# Patient Record
Sex: FEMALE | Race: OTHER | HISPANIC OR LATINO | Employment: UNEMPLOYED | URBAN - METROPOLITAN AREA
[De-identification: names, ages, dates, MRNs, and addresses within clinical notes are randomized per-mention and may not be internally consistent; named-entity substitution may affect disease eponyms.]

---

## 2019-03-04 ENCOUNTER — APPOINTMENT (EMERGENCY)
Dept: RADIOLOGY | Facility: HOSPITAL | Age: 19
End: 2019-03-04
Payer: OTHER MISCELLANEOUS

## 2019-03-04 ENCOUNTER — HOSPITAL ENCOUNTER (EMERGENCY)
Facility: HOSPITAL | Age: 19
Discharge: HOME | End: 2019-03-04
Attending: EMERGENCY MEDICINE
Payer: OTHER MISCELLANEOUS

## 2019-03-04 VITALS
TEMPERATURE: 98.2 F | HEART RATE: 78 BPM | WEIGHT: 196 LBS | SYSTOLIC BLOOD PRESSURE: 128 MMHG | OXYGEN SATURATION: 99 % | RESPIRATION RATE: 16 BRPM | DIASTOLIC BLOOD PRESSURE: 75 MMHG

## 2019-03-04 DIAGNOSIS — R07.89 LEFT-SIDED CHEST WALL PAIN: ICD-10-CM

## 2019-03-04 DIAGNOSIS — S89.91XA KNEE INJURY, RIGHT, INITIAL ENCOUNTER: Primary | ICD-10-CM

## 2019-03-04 PROCEDURE — 71101 X-RAY EXAM UNILAT RIBS/CHEST: CPT

## 2019-03-04 PROCEDURE — 99283 EMERGENCY DEPT VISIT LOW MDM: CPT

## 2019-03-04 PROCEDURE — 73564 X-RAY EXAM KNEE 4 OR MORE: CPT

## 2019-03-04 RX ORDER — ACETAMINOPHEN 325 MG/1
650 TABLET ORAL ONCE
Status: COMPLETED | OUTPATIENT
Start: 2019-03-04 | End: 2019-03-04

## 2019-03-04 RX ADMIN — ACETAMINOPHEN 650 MG: 325 TABLET, FILM COATED ORAL at 13:25

## 2019-03-04 NOTE — ED PROVIDER NOTES
History  Chief Complaint   Patient presents with    Rib Pain     pt c/o left side rib pain reports 4 days ago approx 150lb box fell and hit her in the left side of the ribs  pt c/o SOB and pain with inspiration   Knee Pain     pt c/o right knee pain and swelling after being hit by box at work  Joaquin Olvera is a 23 yo F presenting with L sided chest wall pain and R sided knee pain x5 days  Pt states last Wednesday she was at work ([employer]) when a "dresser" they were transporting slipped and struck her on the L side of her chest  She states it then swung to the opposite side and struck her lateral R knee  Denies head/neck trauma  Denies falling  She states she immediately had pain over the L side of her chest, and the area was bruised for several days  She states the pain is a 5/10, sharp pain with deep inspiration, and goes up to a 10/10 when lying on her L side  Pt states her R knee was forced inwards after being struck, and she immediately developed pain, particularly in the murali-lateral portion of the knee  Pt was initially able to walk, but stated it was painful  Pain was initially a 5/10, but has since worsened to a 9/10 with movement and walking  Worsened by standing/walking  Alleviated by remaining immobile  Pt has not taken anything for the pain  Pt has not used crutches or a brace  She states she reported the incident to her workplace  History provided by:  Patient   used: No        None       Past Medical History:   Diagnosis Date    Anxiety     Asthma     [facility]     Depression     Schizo affective schizophrenia [facility]        Past Surgical History:   Procedure Laterality Date    DENTAL SURGERY      ELBOW FRACTURE SURGERY Left        History reviewed  No pertinent family history  I have reviewed and agree with the history as documented      Social History     Tobacco Use    Smoking status: Current Every Day Smoker    Smokeless tobacco: Never Used   Substance Use Topics    Alcohol use: Not Currently     Frequency: Never    Drug use: Never        Review of Systems   Constitutional: Negative for chills and fever  Respiratory: Negative for cough, chest tightness, shortness of breath and wheezing  Cardiovascular: Positive for chest pain (L chest wall pain )  Negative for palpitations and leg swelling  Gastrointestinal: Negative for abdominal distention and abdominal pain  Musculoskeletal: Positive for arthralgias (R knee) and joint swelling (R knee)  Negative for back pain, neck pain and neck stiffness  R knee exam limited by pain  Decreased extension and flexion of R knee compared w/ L  Mild swelling noted over anterolateral knee  Negative anterior/posterior drawer  Skin: Positive for wound (Bruising over L thorax)  Negative for color change  Neurological: Negative for dizziness, weakness, light-headedness and numbness  No focal neurologic deficits RLE  Sensation intact  Physical Exam  Physical Exam   Constitutional: She is oriented to person, place, and time  She appears well-developed and well-nourished  No distress  HENT:   Head: Normocephalic and atraumatic  Right Ear: External ear normal    Left Ear: External ear normal    Mouth/Throat: Oropharynx is clear and moist    Eyes: Pupils are equal, round, and reactive to light  EOM are normal    Neck: Normal range of motion  Neck supple  Cardiovascular: Normal rate, regular rhythm, normal heart sounds and intact distal pulses  Exam reveals no gallop and no friction rub  No murmur heard  Pulmonary/Chest: Effort normal and breath sounds normal  No respiratory distress  She has no wheezes  She exhibits tenderness (L side mid-axillary line)  Abdominal: Soft  Bowel sounds are normal  She exhibits no distension and no mass  There is no tenderness  Musculoskeletal: She exhibits tenderness  She exhibits no edema or deformity          Right knee: She exhibits decreased range of motion and swelling (Mild)  Tenderness (anterior knee) found  Legs:  R knee exam limited by pain   Neurological: She is alert and oriented to person, place, and time  No sensory deficit  Skin: Skin is warm and dry  Capillary refill takes less than 2 seconds  She is not diaphoretic  Psychiatric: She has a normal mood and affect  Her behavior is normal  Judgment and thought content normal        Vital Signs  ED Triage Vitals [03/04/19 1134]   Temperature Pulse Respirations Blood Pressure SpO2   98 2 °F (36 8 °C) 75 17 138/67 99 %      Temp Source Heart Rate Source Patient Position - Orthostatic VS BP Location FiO2 (%)   Temporal Monitor Sitting Right arm --      Pain Score       No Pain           Vitals:    03/04/19 1134 03/04/19 1440   BP: 138/67 128/75   Pulse: 75 78   Patient Position - Orthostatic VS: Sitting Sitting       Visual Acuity      ED Medications  Medications   acetaminophen (TYLENOL) tablet 650 mg (650 mg Oral Given 3/4/19 1325)       Diagnostic Studies  Results Reviewed     None                 XR ribs with pa chest min 3 views LEFT   ED Interpretation by Yaniv Broussard PA-C (03/04 1345)   No fracture noted on CXR and rib series  Normal lung fields, normal cardiac silhouette, no infiltrate noted      Final Result by Karon Holter, MD (03/04 1417)      No active cardiopulmonary disease  No evidence of rib fractures  Workstation performed: [id number]         XR knee 4+ views Right injury   ED Interpretation by Yaniv Broussard PA-C (03/04 1336)   No acute osseous abnormalities noted      Final Result by Karon Holter, MD (03/04 1415)      No acute osseous abnormality  Workstation performed: [id number]                    Procedures  Procedures       Phone Contacts  ED Phone Contact    ED Course  ED Course as of Mar 04 1530   Mon Mar 04, 2019   1310 Pt reports no improvement after reglan   Will try another dose of Zofran       1345 XR knee 4+ views Right injury   1400 R knee ace wrapped  Distal pulses/sensation rechecked and intact  MDM    Disposition  Final diagnoses:   Knee injury, right, initial encounter   Left-sided chest wall pain     Time reflects when diagnosis was documented in both MDM as applicable and the Disposition within this note     Time User Action Codes Description Comment    3/4/2019  2:09 PM Douglas, Luis Rue Pain Leve Knee injury, right, initial encounter     3/4/2019  2:09 PM Moni Lane Add [R07 89] Left-sided chest wall pain       ED Disposition     ED Disposition Condition Date/Time Comment    Discharge Stable Mon Mar 4, 2019  2:04 PM Marbin Ramires discharge to home/self care  Follow-up Information     Follow up With Specialties Details Why [id number] [facility]  Schedule an appointment as soon as possible for a visit in 1 day  [address] [facility]  [phone]          There are no discharge medications for this patient  No discharge procedures on file      ED Provider  Electronically Signed by           Blythe Denver, PA-C  03/04/19 6478

## 2019-03-12 ENCOUNTER — OFFICE VISIT (OUTPATIENT)
Dept: OBGYN CLINIC | Facility: CLINIC | Age: 19
End: 2019-03-12
Payer: OTHER MISCELLANEOUS

## 2019-03-12 VITALS
HEIGHT: 70 IN | DIASTOLIC BLOOD PRESSURE: 75 MMHG | WEIGHT: 198 LBS | HEART RATE: 72 BPM | SYSTOLIC BLOOD PRESSURE: 112 MMHG | BODY MASS INDEX: 28.35 KG/M2

## 2019-03-12 DIAGNOSIS — M25.461 EFFUSION OF RIGHT KNEE: Primary | ICD-10-CM

## 2019-03-12 PROCEDURE — 99243 OFF/OP CNSLTJ NEW/EST LOW 30: CPT | Performed by: ORTHOPAEDIC SURGERY

## 2019-03-12 NOTE — PROGRESS NOTES
Chief Complaint   Patient presents with    Right Knee - Pain           Assessment:  R Knee effusion    Rule out internal derangement R knee    Plan :  I told the patient she has swelling in her knee and we must rule out a true tear of something inside the knee  Her x-rays are normal and that is a good sign showing that there is no fracture  I did not  any gross ligamentous laxity and that is a good sign  For now I will treat her conservatively  I put her in a knee immobilizer that I want him wearing full-time day and night and she can take it off only for bathing once a day  I showed her exercises to do in the brace that  I want her  to do twice a day at home every single day  She can unwrap the straps and put a large trash bag of ice or bag of frozen peas on that knee for 15 minutes, 4 times a day, if needed for pain or swelling  She can also take Advil, Aleve, or Tylenol if needed for pain  I will see her back again here in  10 days for clinical re-evaluation and further studies  If her symptoms persist, then further investigation with an MRI may be needed  I gave her a note for no work for at least 2 weeks  HPI:   This is a 22 y o  female presenting today for orthopedic evaluation of her right knee injury sustained 2/26/19 while at work  She works at [employer] and was unloading a box when it fell and caused her to twist her right knee  The emergency room record stated that the box struck her lateral right knee  She denied falling  She stated she had swelling the next day and tried to self treat herself  She then went to the emergency room at [facility] on 03/04/2019 she had a contusion of her left chest wall and swollen right knee  She got crutches on her own but has not been immobilized  She still complains of significant pain and limited motion    She has significant other medical problems including bipolar disorder and schizoaffective schizophrenia which will influence treatment and prognosis    PMHx:         Past Medical History:   Diagnosis Date    Anxiety     Asthma     [facility]     Depression     [facility]        Past Surgical History:   Procedure Laterality Date    DENTAL SURGERY      ELBOW FRACTURE SURGERY Left        History reviewed  No pertinent family history  Social History     Socioeconomic History    Marital status: Single     Spouse name: Not on file    Number of children: Not on file    Years of education: Not on file    Highest education level: Not on file   Occupational History    Not on file   Social Needs    Financial resource strain: Not on file    Food insecurity:     Worry: Not on file     Inability: Not on file    Transportation needs:     Medical: Not on file     Non-medical: Not on file   Tobacco Use    Smoking status: Current Every Day Smoker    Smokeless tobacco: Never Used   Substance and Sexual Activity    Alcohol use: Not Currently     Frequency: Never    Drug use: Never    Sexual activity: Not on file   Lifestyle    Physical activity:     Days per week: Not on file     Minutes per session: Not on file    Stress: Not on file   Relationships    Social connections:     Talks on phone: Not on file     Gets together: Not on file     Attends Jain service: Not on file     Active member of club or organization: Not on file     Attends meetings of clubs or organizations: Not on file     Relationship status: Not on file    Intimate partner violence:     Fear of current or ex partner: Not on file     Emotionally abused: Not on file     Physically abused: Not on file     Forced sexual activity: Not on file   Other Topics Concern    Not on file   Social History Narrative    Not on file       No current outpatient medications on file  No current facility-administered medications for this visit          Allergies: Geodon [ziprasidone] and Motrin [ibuprofen]    ROS:  Positive for weight gain, cough, leg swelling, headache, anxiety,/depression, and orthopedic complaints as  The remaining 6/12 systems on the intake sheet that I reviewed were negative  PE:  /75   Pulse 72   Ht 5' 10" (1 778 m)   Wt 89 8 kg (198 lb)   BMI 28 41 kg/m²   Constitutional: The patient was  oriented to person, place, and time  She was mildly heavy  In no acute distress  HEENT: Vision intact  Hearing normal  Swallowing normal   Head: Normocephalic  Cardiovascular: Intact distal pulses  Pulse regular  Pulmonary/Chest: Effort normal  No respiratory distress  Neurological: Alert and oriented to person, place, and time  Skin: Skin is warm  Psychiatric: Normal mood and affect  Ortho Exam:  She had least to 1 to 2+ effusion of her right knee without ecchymosis or redness  With coaxing I can get her to extend fully but she lacks 30° of full flexion on the right compared to left secondary to pain  There was no angular deformity  She had mild anterior laxity on drawer testing but equal bilaterally  She had no mediolateral instability but this was masked somewhat by muscle spasm  She had no popliteal adenopathy no calf tenderness  She showed good motion, capillary refill, sensation in the toes  She was limping but not using any external aids  Studies reviewed:   X-rays of her right knee were personally reviewed as well as the radiologist's report    There is no fracture, dislocation, degenerative change, loose bodies, or lytic areas      Scribe Attestation    I,:    am acting as a scribe while in the presence of the attending physician :        I,:    personally performed the services described in this documentation    as scribed in my presence :

## 2019-03-12 NOTE — PATIENT INSTRUCTIONS
I told the patient she has swelling in her knee and we must rule out a true tear of something inside the knee  Her x-rays are normal and that is a good sign showing that there is no fracture  I did not  any gross ligamentous laxity and that is a good sign  For now I will treat her conservatively  I put her in a knee immobilizer that I want him wearing full-time day and night and she can take it off only for bathing once a day  I showed her exercises to do in the brace that  I want her  to do twice a day at home every single day  She can unwrap the straps and put a large trash bag of ice or bag of frozen peas on that knee for 15 minutes, 4 times a day, if needed for pain or swelling  She can also take Advil, Aleve, or Tylenol if needed for pain  I will see her back again here in  10 days for clinical re-evaluation and further studies  If her symptoms persist, then further investigation with an MRI may be needed  I gave her a note for no work for at least 2 weeks

## 2019-03-12 NOTE — LETTER
March 12, 2019     Allen Polanco, [phone]  [phone]    Patient: Ramone Stout   YOB: 2000   Date of Visit: 3/12/2019       Dear Ms Fang:    Thank you for referring Ramone Stout to me for evaluation  Below are my notes for this consultation  If you have questions, please do not hesitate to call me  I look forward to following your patient along with you  Sincerely,        Jyoti Murguia MD        CC: No Recipients  Jyoti Murguia MD  3/12/2019 11:53 AM  Sign at close encounter  Chief Complaint   Patient presents with    Right Knee - Pain           Assessment:  R Knee effusion    Rule out internal derangement R knee    Plan :  I told the patient she has swelling in her knee and we must rule out a true tear of something inside the knee  Her x-rays are normal and that is a good sign showing that there is no fracture  I did not  any gross ligamentous laxity and that is a good sign  For now I will treat her conservatively  I put her in a knee immobilizer that I want him wearing full-time day and night and she can take it off only for bathing once a day  I showed her exercises to do in the brace that  I want her  to do twice a day at home every single day  She can unwrap the straps and put a large trash bag of ice or bag of frozen peas on that knee for 15 minutes, 4 times a day, if needed for pain or swelling  She can also take Advil, Aleve, or Tylenol if needed for pain  I will see her back again here in  10 days for clinical re-evaluation and further studies  If her symptoms persist, then further investigation with an MRI may be needed  I gave her a note for no work for at least 2 weeks  HPI:   This is a 22 y o  female presenting today for orthopedic evaluation of her right knee injury sustained 2/26/19 while at work  She works at [employer] and was unloading a box when it fell and caused her to twist her right knee    The emergency room record stated that the box struck her lateral right knee  She denied falling  She stated she had swelling the next day and tried to self treat herself  She then went to the emergency room at [facility] on 03/04/2019 she had a contusion of her left chest wall and swollen right knee  She got crutches on her own but has not been immobilized  She still complains of significant pain and limited motion  She has significant other medical problems including bipolar disorder and schizoaffective schizophrenia which will influence treatment and prognosis    PMHx:         Past Medical History:   Diagnosis Date    Anxiety     Asthma     [facility]     Depression     [facility] affective schizophrenia ([facility] )        Past Surgical History:   Procedure Laterality Date    DENTAL SURGERY      ELBOW FRACTURE SURGERY Left        History reviewed  No pertinent family history      Social History     Socioeconomic History    Marital status: Single     Spouse name: Not on file    Number of children: Not on file    Years of education: Not on file    Highest education level: Not on file   Occupational History    Not on file   Social Needs    Financial resource strain: Not on file    Food insecurity:     Worry: Not on file     Inability: Not on file    Transportation needs:     Medical: Not on file     Non-medical: Not on file   Tobacco Use    Smoking status: Current Every Day Smoker    Smokeless tobacco: Never Used   Substance and Sexual Activity    Alcohol use: Not Currently     Frequency: Never    Drug use: Never    Sexual activity: Not on file   Lifestyle    Physical activity:     Days per week: Not on file     Minutes per session: Not on file    Stress: Not on file   Relationships    Social connections:     Talks on phone: Not on file     Gets together: Not on file     Attends Mormon service: Not on file     Active member of club or organization: Not on file     Attends meetings of clubs or organizations: Not on file     Relationship status: Not on file    Intimate partner violence:     Fear of current or ex partner: Not on file     Emotionally abused: Not on file     Physically abused: Not on file     Forced sexual activity: Not on file   Other Topics Concern    Not on file   Social History Narrative    Not on file       No current outpatient medications on file  No current facility-administered medications for this visit  Allergies: Geodon [ziprasidone] and Motrin [ibuprofen]    ROS:  Positive for weight gain, cough, leg swelling, headache, anxiety,/depression, and orthopedic complaints as  The remaining 6/12 systems on the intake sheet that I reviewed were negative  PE:  /75   Pulse 72   Ht 5' 10" (1 778 m)   Wt 89 8 kg (198 lb)   BMI 28 41 kg/m²    Constitutional: The patient was  oriented to person, place, and time  She was mildly heavy  In no acute distress  HEENT: Vision intact  Hearing normal  Swallowing normal   Head: Normocephalic  Cardiovascular: Intact distal pulses  Pulse regular  Pulmonary/Chest: Effort normal  No respiratory distress  Neurological: Alert and oriented to person, place, and time  Skin: Skin is warm  Psychiatric: Normal mood and affect  Ortho Exam:  She had least to 1 to 2+ effusion of her right knee without ecchymosis or redness  With coaxing I can get her to extend fully but she lacks 30° of full flexion on the right compared to left secondary to pain  There was no angular deformity  She had mild anterior laxity on drawer testing but equal bilaterally  She had no mediolateral instability but this was masked somewhat by muscle spasm  She had no popliteal adenopathy no calf tenderness  She showed good motion, capillary refill, sensation in the toes  She was limping but not using any external aids  Studies reviewed:   X-rays of her right knee were personally reviewed as well as the radiologist's report  There is no fracture, dislocation, degenerative change, loose bodies, or lytic areas      Scribe Attestation    I,:    am acting as a scribe while in the presence of the attending physician :        I,:    personally performed the services described in this documentation    as scribed in my presence :

## 2019-03-12 NOTE — LETTER
March 12, 2019     Patient: London Crawford   YOB: 2000   Date of Visit: 3/12/2019       To Whom it May Concern:    London Crawford is under my professional care  She was seen in my office on 3/12/2019  She is unable to work for 2 weeks due to her right knee injury  If you have any questions or concerns, please don't hesitate to call           Sincerely,          Consuelo Telles MD        CC: No Recipients

## 2019-03-21 ENCOUNTER — OFFICE VISIT (OUTPATIENT)
Dept: OBGYN CLINIC | Facility: CLINIC | Age: 19
End: 2019-03-21
Payer: OTHER MISCELLANEOUS

## 2019-03-21 VITALS
DIASTOLIC BLOOD PRESSURE: 71 MMHG | BODY MASS INDEX: 28.35 KG/M2 | HEIGHT: 70 IN | SYSTOLIC BLOOD PRESSURE: 115 MMHG | WEIGHT: 198 LBS | HEART RATE: 85 BPM

## 2019-03-21 DIAGNOSIS — S80.01XD CONTUSION OF RIGHT KNEE, SUBSEQUENT ENCOUNTER: Primary | ICD-10-CM

## 2019-03-21 PROBLEM — S80.01XA CONTUSION OF RIGHT KNEE: Status: ACTIVE | Noted: 2019-03-21

## 2019-03-21 PROCEDURE — 99213 OFFICE O/P EST LOW 20 MIN: CPT | Performed by: ORTHOPAEDIC SURGERY

## 2019-03-21 NOTE — LETTER
March 21, 2019     Patient: Emely Min   YOB: 2000   Date of Visit: 3/21/2019       To Whom it May Concern:    Emely Min is under my professional care  She was seen in my office on 3/21/2019  She has recovered from her right knee injury and can return to full unrestricted work duties on 03/25/2019  If you have any questions or concerns, please don't hesitate to call           Sincerely,          Monica Franco MD        CC: Emely Min

## 2019-03-21 NOTE — LETTER
March 21, 2019     Jade Sellers, [address] [address] [facility] [id number]    Patient: Oliverio Mcdonough   YOB: 2000   Date of Visit: 3/21/2019       Dear Ms Fang:    Thank you for referring Oliverio Mcdonough to me for evaluation  Below are my notes for this consultation  If you have questions, please do not hesitate to call me  I look forward to following your patient along with you  Sincerely,        Polo Leyva MD        CC: No Recipients  Polo Leyva MD  3/21/2019  1:18 PM  Sign at close encounter  Chief Complaint   Patient presents with    Right Knee - Follow-up           Assessment:  R Knee effusion    Rule out internal derangement R knee    Plan :  I am very pleased with her clinical improvement  The swelling is gone and her pain is gone  I stopped the knee immobilizer  I showed her the exercises I do want her to do at home which are mainly isometric and hamstring stretching  I do not like bending or weight lifting exercises so soon after injury that had taken her out of work for few weeks  She may start those weight lifting exercises in another month, if desired  She may walk as much as tolerated  She may continue to use ice and Advil on an as-needed basis  I gave her a note to return to full work duties on 03/25/2019  I sent her back to her family doctor for routine care and will see her back again if she has any further issues    HPI:   This is a 22 y o  female presenting today for orthopedic evaluation of her right knee injury sustained 2/26/19 while at work  She works at [employer] and was unloading a box when it fell and caused her to twist her right knee  The emergency room record stated that the box struck her lateral right knee  She denied falling  She stated she had swelling the next day and tried to self treat herself    She then went to the emergency room at [facility] on 03/04/2019 she had a contusion of her left chest wall and swollen right knee  She got crutches on her own but has not been immobilized  She still complains of significant pain and limited motion  She has significant other medical problems including bipolar disorder and schizoaffective schizophrenia which will influence treatment and prognosis  I treated her as a contusion of her knee with immobilization, ice, Advil on a home exercise program when I saw her just 9 days ago  She returns today on 03/21/2019  She is 3 and half weeks after the injury at work  She is markedly better  She wore the immobilize most of the time but then took it off to try to start working out at the gym even though I did not recommend this to her  Her swelling is gone  She has no numbness, tingling, or paresthesias    The remainder of this patient's past medical history, family history, social history, medicines, and allergies was reviewed in the chart  Please see HPI for pertinent review of systems  All other systems reviewed are negative  She has anxiety/depression, asthma, bipolar disorder, schizoaffective schizophrenia by history  PE:  /71   Pulse 85   Ht 5' 10" (1 778 m)   Wt 89 8 kg (198 lb)   BMI 28 41 kg/m²    Effusion has resolved  She has no residual ecchymosis or redness  She is not tender around the patella, medial or lateral joint line of that right knee  She demonstrated no ligamentous laxity on anterior drawer or Lachman's maneuver and no medial lateral laxity comparing right to the left knee  She had no popliteal tenderness  She had no calf tenderness  She showed good motion, capillary refill, sensation in the toes of the right foot  Her knee motion is surprisingly good bending at least from 0-110 degrees just out of the brace  Studies reviewed:   X-rays of her right knee were personally reviewed as well as the radiologist's report    There is no fracture, dislocation, degenerative change, loose bodies, or lytic areas      Bisi Palacios I,:    am acting as a scribe while in the presence of the attending physician :        I,:    personally performed the services described in this documentation    as scribed in my presence :

## 2019-03-21 NOTE — PATIENT INSTRUCTIONS
Plan :  I am very pleased with her clinical improvement  The swelling is gone and her pain is gone  I stopped the knee immobilizer  I showed her the exercises I do want her to do at home which are mainly isometric and hamstring stretching  I do not like bending or weight lifting exercises so soon after injury that had taken her out of work for few weeks  She may start those weight lifting exercises in another month, if desired  She may walk as much as tolerated  She may continue to use ice and Advil on an as-needed basis  I gave her a note to return to full work duties on 03/25/2019    I sent her back to her family doctor for routine care and will see her back again if she has any further issues

## 2019-03-21 NOTE — PROGRESS NOTES
Chief Complaint   Patient presents with    Right Knee - Follow-up           Assessment:  R Knee effusion    Rule out internal derangement R knee    Plan :  I am very pleased with her clinical improvement  The swelling is gone and her pain is gone  I stopped the knee immobilizer  I showed her the exercises I do want her to do at home which are mainly isometric and hamstring stretching  I do not like bending or weight lifting exercises so soon after injury that had taken her out of work for few weeks  She may start those weight lifting exercises in another month, if desired  She may walk as much as tolerated  She may continue to use ice and Advil on an as-needed basis  I gave her a note to return to full work duties on 03/25/2019  I sent her back to her family doctor for routine care and will see her back again if she has any further issues    HPI:   This is a 22 y o  female presenting today for orthopedic evaluation of her right knee injury sustained 2/26/19 while at work  She works at [employer] and was unloading a box when it fell and caused her to twist her right knee  The emergency room record stated that the box struck her lateral right knee  She denied falling  She stated she had swelling the next day and tried to self treat herself  She then went to the emergency room at [facility] on 03/04/2019 she had a contusion of her left chest wall and swollen right knee  She got crutches on her own but has not been immobilized  She still complains of significant pain and limited motion  She has significant other medical problems including bipolar disorder and schizoaffective schizophrenia which will influence treatment and prognosis  I treated her as a contusion of her knee with immobilization, ice, Advil on a home exercise program when I saw her just 9 days ago  She returns today on 03/21/2019  She is 3 and half weeks after the injury at work  She is markedly better    She wore the immobilize most of the time but then took it off to try to start working out at the gym even though I did not recommend this to her  Her swelling is gone  She has no numbness, tingling, or paresthesias    The remainder of this patient's past medical history, family history, social history, medicines, and allergies was reviewed in the chart  Please see HPI for pertinent review of systems  All other systems reviewed are negative  She has anxiety/depression, asthma, bipolar disorder, schizoaffective schizophrenia by history  PE:  /71   Pulse 85   Ht 5' 10" (1 778 m)   Wt 89 8 kg (198 lb)   BMI 28 41 kg/m²   Effusion has resolved  She has no residual ecchymosis or redness  She is not tender around the patella, medial or lateral joint line of that right knee  She demonstrated no ligamentous laxity on anterior drawer or Lachman's maneuver and no medial lateral laxity comparing right to the left knee  She had no popliteal tenderness  She had no calf tenderness  She showed good motion, capillary refill, sensation in the toes of the right foot  Her knee motion is surprisingly good bending at least from 0-110 degrees just out of the brace  Studies reviewed:   X-rays of her right knee were personally reviewed as well as the radiologist's report    There is no fracture, dislocation, degenerative change, loose bodies, or lytic areas      Scribe Attestation    I,:    am acting as a scribe while in the presence of the attending physician :        I,:    personally performed the services described in this documentation    as scribed in my presence :

## 2019-03-21 NOTE — PROGRESS NOTES
CC: Right knee pain        Assessment:  ***    Plan :  ***    HPI:       PMHx:         Past Medical History:   Diagnosis Date    Anxiety     Asthma     Bipolar 1 disorder ([facility] )     Depression     Schizo affective schizophrenia ([facility] )        Past Surgical History:   Procedure Laterality Date    DENTAL SURGERY      ELBOW FRACTURE SURGERY Left        No family history on file  Social History     Socioeconomic History    Marital status: Single     Spouse name: Not on file    Number of children: Not on file    Years of education: Not on file    Highest education level: Not on file   Occupational History    Not on file   Social Needs    Financial resource strain: Not on file    Food insecurity:     Worry: Not on file     Inability: Not on file    Transportation needs:     Medical: Not on file     Non-medical: Not on file   Tobacco Use    Smoking status: Current Every Day Smoker    Smokeless tobacco: Never Used   Substance and Sexual Activity    Alcohol use: Not Currently     Frequency: Never    Drug use: Never    Sexual activity: Not on file   Lifestyle    Physical activity:     Days per week: Not on file     Minutes per session: Not on file    Stress: Not on file   Relationships    Social connections:     Talks on phone: Not on file     Gets together: Not on file     Attends Taoist service: Not on file     Active member of club or organization: Not on file     Attends meetings of clubs or organizations: Not on file     Relationship status: Not on file    Intimate partner violence:     Fear of current or ex partner: Not on file     Emotionally abused: Not on file     Physically abused: Not on file     Forced sexual activity: Not on file   Other Topics Concern    Not on file   Social History Narrative    Not on file       No current outpatient medications on file  No current facility-administered medications for this visit          Allergies: Geodon [ziprasidone] and Motrin [ibuprofen]    ROS:  Positive for ***  The remaining ***/12 systems on the intake sheet that I reviewed were negative  PE:  There were no vitals taken for this visit  Constitutional: The patient was  oriented to person, place, and time  Well-developed and well-nourished  In no acute distress  HEENT: Vision intact  Hearing normal  Swallowing normal   Head: Normocephalic  Cardiovascular: Intact distal pulses  Pulse regular  Pulmonary/Chest: Effort normal  No respiratory distress  Neurological: Alert and oriented to person, place, and time  Skin: Skin is warm  Psychiatric: Normal mood and affect       Ortho Exam:  ***    Studies reviewed: ***

## 2020-11-18 ENCOUNTER — HOSPITAL ENCOUNTER (EMERGENCY)
Facility: HOSPITAL | Age: 20
Discharge: HOME | End: 2020-11-18
Attending: EMERGENCY MEDICINE | Admitting: EMERGENCY MEDICINE
Payer: COMMERCIAL

## 2020-11-18 ENCOUNTER — APPOINTMENT (EMERGENCY)
Dept: CT IMAGING | Facility: HOSPITAL | Age: 20
End: 2020-11-18
Payer: COMMERCIAL

## 2020-11-18 VITALS
BODY MASS INDEX: 28.22 KG/M2 | WEIGHT: 196.65 LBS | SYSTOLIC BLOOD PRESSURE: 143 MMHG | OXYGEN SATURATION: 98 % | HEART RATE: 87 BPM | TEMPERATURE: 98.4 F | DIASTOLIC BLOOD PRESSURE: 86 MMHG | RESPIRATION RATE: 18 BRPM

## 2020-11-18 DIAGNOSIS — S00.83XA CONTUSION OF JAW, INITIAL ENCOUNTER: Primary | ICD-10-CM

## 2020-11-18 PROCEDURE — 99282 EMERGENCY DEPT VISIT SF MDM: CPT | Performed by: PHYSICIAN ASSISTANT

## 2020-11-18 PROCEDURE — 99283 EMERGENCY DEPT VISIT LOW MDM: CPT

## 2020-11-18 PROCEDURE — 70486 CT MAXILLOFACIAL W/O DYE: CPT

## 2020-11-18 PROCEDURE — G1004 CDSM NDSC: HCPCS

## 2021-08-28 ENCOUNTER — APPOINTMENT (EMERGENCY)
Dept: RADIOLOGY | Facility: HOSPITAL | Age: 21
End: 2021-08-28

## 2021-08-28 ENCOUNTER — APPOINTMENT (EMERGENCY)
Dept: CT IMAGING | Facility: HOSPITAL | Age: 21
End: 2021-08-28

## 2021-08-28 ENCOUNTER — HOSPITAL ENCOUNTER (EMERGENCY)
Facility: HOSPITAL | Age: 21
Discharge: HOME | End: 2021-08-28
Attending: EMERGENCY MEDICINE

## 2021-08-28 VITALS
RESPIRATION RATE: 16 BRPM | HEART RATE: 64 BPM | SYSTOLIC BLOOD PRESSURE: 105 MMHG | OXYGEN SATURATION: 99 % | DIASTOLIC BLOOD PRESSURE: 56 MMHG | BODY MASS INDEX: 30.05 KG/M2 | WEIGHT: 209.44 LBS | TEMPERATURE: 97.8 F

## 2021-08-28 DIAGNOSIS — M54.9 BACK PAIN: ICD-10-CM

## 2021-08-28 DIAGNOSIS — S09.90XA INJURY OF HEAD, INITIAL ENCOUNTER: ICD-10-CM

## 2021-08-28 DIAGNOSIS — M54.2 NECK PAIN: ICD-10-CM

## 2021-08-28 DIAGNOSIS — M25.539 WRIST PAIN: ICD-10-CM
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
## 2021-08-28 DIAGNOSIS — S06.0X9A CONCUSSION: ICD-10-CM

## 2021-08-28 LAB
ALBUMIN SERPL BCP-MCNC: 4 G/DL (ref 3.5–5)
ALP SERPL-CCNC: 93 U/L (ref 46–116)
ALT SERPL W P-5'-P-CCNC: 25 U/L (ref 12–78)
ANION GAP SERPL CALCULATED.3IONS-SCNC: 10 MMOL/L (ref 4–13)
AST SERPL W P-5'-P-CCNC: 20 U/L (ref 5–45)
BASOPHILS # BLD AUTO: 0.03 THOUSANDS/ΜL (ref 0–0.1)
BASOPHILS NFR BLD AUTO: 0 % (ref 0–1)
BILIRUB SERPL-MCNC: 0.6 MG/DL (ref 0.2–1)
BUN SERPL-MCNC: 7 MG/DL (ref 5–25)
CALCIUM SERPL-MCNC: 8.3 MG/DL (ref 8.3–10.1)
CHLORIDE SERPL-SCNC: 105 MMOL/L (ref 100–108)
CO2 SERPL-SCNC: 26 MMOL/L (ref 21–32)
CREAT SERPL-MCNC: 0.9 MG/DL (ref 0.6–1.3)
EOSINOPHIL # BLD AUTO: 0.14 THOUSAND/ΜL (ref 0–0.61)
EOSINOPHIL NFR BLD AUTO: 1 % (ref 0–6)
ERYTHROCYTE [DISTWIDTH] IN BLOOD BY AUTOMATED COUNT: 11.7 % (ref 11.6–15.1)
EXT PREG TEST URINE: NEGATIVE
EXT. CONTROL ED NAV: NORMAL
GFR SERPL CREATININE-BSD FRML MDRD: 91 ML/MIN/1.73SQ M
GLUCOSE SERPL-MCNC: 89 MG/DL (ref 65–140)
HCT VFR BLD AUTO: 46.1 % (ref 34.8–46.1)
HGB BLD-MCNC: 15.7 G/DL (ref 11.5–15.4)
IMM GRANULOCYTES # BLD AUTO: 0.06 THOUSAND/UL (ref 0–0.2)
IMM GRANULOCYTES NFR BLD AUTO: 0 % (ref 0–2)
LIPASE SERPL-CCNC: 135 U/L (ref 73–393)
LYMPHOCYTES # BLD AUTO: 2.75 THOUSANDS/ΜL (ref 0.6–4.47)
LYMPHOCYTES NFR BLD AUTO: 19 % (ref 14–44)
MCH RBC QN AUTO: 30.1 PG (ref 26.8–34.3)
MCHC RBC AUTO-ENTMCNC: 34.1 G/DL (ref 31.4–37.4)
MCV RBC AUTO: 89 FL (ref 82–98)
MONOCYTES # BLD AUTO: 0.73 THOUSAND/ΜL (ref 0.17–1.22)
MONOCYTES NFR BLD AUTO: 5 % (ref 4–12)
NEUTROPHILS # BLD AUTO: 10.58 THOUSANDS/ΜL (ref 1.85–7.62)
NEUTS SEG NFR BLD AUTO: 75 % (ref 43–75)
NRBC BLD AUTO-RTO: 0 /100 WBCS
PLATELET # BLD AUTO: 225 THOUSANDS/UL (ref 149–390)
PMV BLD AUTO: 10.8 FL (ref 8.9–12.7)
POTASSIUM SERPL-SCNC: 3.7 MMOL/L (ref 3.5–5.3)
PROT SERPL-MCNC: 7.3 G/DL (ref 6.4–8.2)
RBC # BLD AUTO: 5.21 MILLION/UL (ref 3.81–5.12)
SODIUM SERPL-SCNC: 141 MMOL/L (ref 136–145)
WBC # BLD AUTO: 14.29 THOUSAND/UL (ref 4.31–10.16)

## 2021-08-28 PROCEDURE — 85025 COMPLETE CBC W/AUTO DIFF WBC: CPT

## 2021-08-28 PROCEDURE — 74177 CT ABD & PELVIS W/CONTRAST: CPT

## 2021-08-28 PROCEDURE — 71260 CT THORAX DX C+: CPT

## 2021-08-28 PROCEDURE — 81025 URINE PREGNANCY TEST: CPT

## 2021-08-28 PROCEDURE — 80053 COMPREHEN METABOLIC PANEL: CPT

## 2021-08-28 PROCEDURE — 96374 THER/PROPH/DIAG INJ IV PUSH: CPT

## 2021-08-28 PROCEDURE — 73110 X-RAY EXAM OF WRIST: CPT

## 2021-08-28 PROCEDURE — 90715 TDAP VACCINE 7 YRS/> IM: CPT

## 2021-08-28 PROCEDURE — 96375 TX/PRO/DX INJ NEW DRUG ADDON: CPT

## 2021-08-28 PROCEDURE — 73080 X-RAY EXAM OF ELBOW: CPT

## 2021-08-28 PROCEDURE — 83690 ASSAY OF LIPASE: CPT

## 2021-08-28 PROCEDURE — 36415 COLL VENOUS BLD VENIPUNCTURE: CPT

## 2021-08-28 PROCEDURE — 90471 IMMUNIZATION ADMIN: CPT

## 2021-08-28 PROCEDURE — 70450 CT HEAD/BRAIN W/O DYE: CPT

## 2021-08-28 PROCEDURE — 99284 EMERGENCY DEPT VISIT MOD MDM: CPT

## 2021-08-28 PROCEDURE — 72125 CT NECK SPINE W/O DYE: CPT

## 2021-08-28 PROCEDURE — G1004 CDSM NDSC: HCPCS

## 2021-08-28 PROCEDURE — 99285 EMERGENCY DEPT VISIT HI MDM: CPT | Performed by: EMERGENCY MEDICINE

## 2021-08-28 RX ORDER — ONDANSETRON 2 MG/ML
4 INJECTION INTRAMUSCULAR; INTRAVENOUS ONCE
Status: COMPLETED | OUTPATIENT
Start: 2021-08-28 | End: 2021-08-28

## 2021-08-28 RX ADMIN — ONDANSETRON 4 MG: 2 INJECTION INTRAMUSCULAR; INTRAVENOUS at 19:58

## 2021-08-28 RX ADMIN — TETANUS TOXOID, REDUCED DIPHTHERIA TOXOID AND ACELLULAR PERTUSSIS VACCINE, ADSORBED 0.5 ML: 5; 2.5; 8; 8; 2.5 SUSPENSION INTRAMUSCULAR at 20:42

## 2021-08-28 RX ADMIN — IOHEXOL 100 ML: 350 INJECTION, SOLUTION INTRAVENOUS at 21:25

## 2021-08-28 RX ADMIN — MORPHINE SULFATE 2 MG: 2 INJECTION, SOLUTION INTRAMUSCULAR; INTRAVENOUS at 19:58
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
## 2021-08-28 NOTE — ED NOTES
Resident at bedside        Milton Adams, [address] [facility]  08/28/21 [facility]
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
## 2022-04-23 ENCOUNTER — HOSPITAL ENCOUNTER (EMERGENCY)
Facility: HOSPITAL | Age: 22
Discharge: HOME | End: 2022-04-23
Attending: EMERGENCY MEDICINE | Admitting: EMERGENCY MEDICINE
Payer: MEDICARE

## 2022-04-23 ENCOUNTER — APPOINTMENT (EMERGENCY)
Dept: RADIOLOGY | Facility: HOSPITAL | Age: 22
End: 2022-04-23
Payer: MEDICARE

## 2022-04-23 VITALS
RESPIRATION RATE: 16 BRPM | HEART RATE: 88 BPM | OXYGEN SATURATION: 95 % | TEMPERATURE: 97.7 F | BODY MASS INDEX: 28.98 KG/M2 | SYSTOLIC BLOOD PRESSURE: 151 MMHG | DIASTOLIC BLOOD PRESSURE: 90 MMHG | WEIGHT: 202 LBS

## 2022-04-23 DIAGNOSIS — M25.569 KNEE PAIN: ICD-10-CM
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
## 2022-04-23 PROCEDURE — 99284 EMERGENCY DEPT VISIT MOD MDM: CPT | Performed by: PHYSICIAN ASSISTANT

## 2022-04-23 PROCEDURE — 99284 EMERGENCY DEPT VISIT MOD MDM: CPT

## 2022-04-23 PROCEDURE — 73564 X-RAY EXAM KNEE 4 OR MORE: CPT

## 2022-04-23 RX ORDER — ACETAMINOPHEN 325 MG/1
650 TABLET ORAL ONCE
Status: DISCONTINUED | OUTPATIENT
Start: 2022-04-23 | End: 2022-04-23 | Stop reason: HOSPADM
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
## 2022-04-23 NOTE — ED NOTES
Patient cursing about not receiving anything else for pain other than Tylenol  Patient stated she was going to go to [facility] after this so she can go there and be treated for pain  Patient cursing and using fowel language and yelling at the staff saying that we are not treating her right, we are treating her like an animal   The only words that come out of her mouth are fuck this and fuck that  Patient asked earlier if she could be a little quiet there are children in the department, but obviously this did not help       Shea Jain RN  04/23/22 6739

## 2022-06-29 ENCOUNTER — HOSPITAL ENCOUNTER (EMERGENCY)
Facility: HOSPITAL | Age: 22
Discharge: HOME | End: 2022-06-29
Attending: EMERGENCY MEDICINE | Admitting: EMERGENCY MEDICINE
Payer: MEDICARE

## 2022-06-29 ENCOUNTER — APPOINTMENT (EMERGENCY)
Dept: CT IMAGING | Facility: HOSPITAL | Age: 22
End: 2022-06-29
Payer: MEDICARE

## 2022-06-29 VITALS
DIASTOLIC BLOOD PRESSURE: 81 MMHG | BODY MASS INDEX: 26.48 KG/M2 | TEMPERATURE: 98.1 F | OXYGEN SATURATION: 100 % | WEIGHT: 184.53 LBS | SYSTOLIC BLOOD PRESSURE: 127 MMHG | HEART RATE: 61 BPM | RESPIRATION RATE: 18 BRPM

## 2022-06-29 DIAGNOSIS — J02.0 STREP PHARYNGITIS: Primary | ICD-10-CM

## 2022-06-29 LAB
ALBUMIN SERPL BCP-MCNC: 4.7 G/DL (ref 3–5.2)
ALP SERPL-CCNC: 83 U/L (ref 43–122)
ALT SERPL W P-5'-P-CCNC: 24 U/L
ANION GAP SERPL CALCULATED.3IONS-SCNC: 7 MMOL/L (ref 5–14)
AST SERPL W P-5'-P-CCNC: 20 U/L (ref 14–36)
BACTERIA UR QL AUTO: NORMAL /HPF
BASOPHILS # BLD AUTO: 0.03 THOUSANDS/ΜL (ref 0–0.1)
BASOPHILS NFR BLD AUTO: 0 % (ref 0–1)
BILIRUB SERPL-MCNC: 1.18 MG/DL
BILIRUB UR QL STRIP: NEGATIVE
BUN SERPL-MCNC: 11 MG/DL (ref 5–25)
CALCIUM SERPL-MCNC: 9.3 MG/DL (ref 8.4–10.2)
CHLORIDE SERPL-SCNC: 106 MMOL/L (ref 97–108)
CLARITY UR: CLEAR
CO2 SERPL-SCNC: 27 MMOL/L (ref 22–30)
COLOR UR: ABNORMAL
CREAT SERPL-MCNC: 0.91 MG/DL (ref 0.6–1.2)
EOSINOPHIL # BLD AUTO: 0.14 THOUSAND/ΜL (ref 0–0.61)
EOSINOPHIL NFR BLD AUTO: 1 % (ref 0–6)
ERYTHROCYTE [DISTWIDTH] IN BLOOD BY AUTOMATED COUNT: 11.8 % (ref 11.6–15.1)
EXT PREG TEST URINE: NEGATIVE
EXT. CONTROL ED NAV: NORMAL
GFR SERPL CREATININE-BSD FRML MDRD: 89 ML/MIN/1.73SQ M
GLUCOSE SERPL-MCNC: 97 MG/DL (ref 70–99)
GLUCOSE UR STRIP-MCNC: NEGATIVE MG/DL
HCT VFR BLD AUTO: 48.2 % (ref 34.8–46.1)
HGB BLD-MCNC: 15.9 G/DL (ref 11.5–15.4)
HGB UR QL STRIP.AUTO: 10
IMM GRANULOCYTES # BLD AUTO: 0.06 THOUSAND/UL (ref 0–0.2)
IMM GRANULOCYTES NFR BLD AUTO: 0 % (ref 0–2)
KETONES UR STRIP-MCNC: NEGATIVE MG/DL
LEUKOCYTE ESTERASE UR QL STRIP: NEGATIVE
LIPASE SERPL-CCNC: 130 U/L (ref 23–300)
LYMPHOCYTES # BLD AUTO: 2.16 THOUSANDS/ΜL (ref 0.6–4.47)
LYMPHOCYTES NFR BLD AUTO: 13 % (ref 14–44)
MAGNESIUM SERPL-MCNC: 2 MG/DL (ref 1.6–2.3)
MCH RBC QN AUTO: 30 PG (ref 26.8–34.3)
MCHC RBC AUTO-ENTMCNC: 33 G/DL (ref 31.4–37.4)
MCV RBC AUTO: 91 FL (ref 82–98)
MONOCYTES # BLD AUTO: 0.84 THOUSAND/ΜL (ref 0.17–1.22)
MONOCYTES NFR BLD AUTO: 5 % (ref 4–12)
NEUTROPHILS # BLD AUTO: 12.84 THOUSANDS/ΜL (ref 1.85–7.62)
NEUTS SEG NFR BLD AUTO: 81 % (ref 43–75)
NITRITE UR QL STRIP: NEGATIVE
NON-SQ EPI CELLS URNS QL MICRO: NORMAL /HPF
NRBC BLD AUTO-RTO: 0 /100 WBCS
PH UR STRIP.AUTO: 6 [PH]
PLATELET # BLD AUTO: 200 THOUSANDS/UL (ref 149–390)
PMV BLD AUTO: 10.9 FL (ref 8.9–12.7)
POTASSIUM SERPL-SCNC: 3.8 MMOL/L (ref 3.6–5)
PROT SERPL-MCNC: 7.9 G/DL (ref 5.9–8.4)
PROT UR STRIP-MCNC: NEGATIVE MG/DL
RBC # BLD AUTO: 5.3 MILLION/UL (ref 3.81–5.12)
RBC #/AREA URNS AUTO: NORMAL /HPF
S PYO DNA THROAT QL NAA+PROBE: DETECTED
SODIUM SERPL-SCNC: 140 MMOL/L (ref 137–147)
SP GR UR STRIP.AUTO: 1.01 (ref 1–1.04)
UROBILINOGEN UA: NEGATIVE MG/DL
WBC # BLD AUTO: 16.07 THOUSAND/UL (ref 4.31–10.16)
WBC #/AREA URNS AUTO: NORMAL /HPF

## 2022-06-29 PROCEDURE — 80053 COMPREHEN METABOLIC PANEL: CPT | Performed by: EMERGENCY MEDICINE

## 2022-06-29 PROCEDURE — 96374 THER/PROPH/DIAG INJ IV PUSH: CPT

## 2022-06-29 PROCEDURE — 96361 HYDRATE IV INFUSION ADD-ON: CPT

## 2022-06-29 PROCEDURE — 81025 URINE PREGNANCY TEST: CPT

## 2022-06-29 PROCEDURE — 83735 ASSAY OF MAGNESIUM: CPT | Performed by: EMERGENCY MEDICINE

## 2022-06-29 PROCEDURE — 85025 COMPLETE CBC W/AUTO DIFF WBC: CPT | Performed by: EMERGENCY MEDICINE

## 2022-06-29 PROCEDURE — 36415 COLL VENOUS BLD VENIPUNCTURE: CPT | Performed by: EMERGENCY MEDICINE

## 2022-06-29 PROCEDURE — 74176 CT ABD & PELVIS W/O CONTRAST: CPT

## 2022-06-29 PROCEDURE — 81001 URINALYSIS AUTO W/SCOPE: CPT

## 2022-06-29 PROCEDURE — 99284 EMERGENCY DEPT VISIT MOD MDM: CPT | Performed by: EMERGENCY MEDICINE

## 2022-06-29 PROCEDURE — 87651 STREP A DNA AMP PROBE: CPT | Performed by: EMERGENCY MEDICINE

## 2022-06-29 PROCEDURE — 83690 ASSAY OF LIPASE: CPT | Performed by: EMERGENCY MEDICINE

## 2022-06-29 PROCEDURE — G1004 CDSM NDSC: HCPCS

## 2022-06-29 PROCEDURE — 81003 URINALYSIS AUTO W/O SCOPE: CPT

## 2022-06-29 PROCEDURE — 96372 THER/PROPH/DIAG INJ SC/IM: CPT

## 2022-06-29 PROCEDURE — 99283 EMERGENCY DEPT VISIT LOW MDM: CPT

## 2022-06-29 RX ORDER — HALOPERIDOL 5 MG/ML
2.5 INJECTION INTRAMUSCULAR ONCE
Status: COMPLETED | OUTPATIENT
Start: 2022-06-29 | End: 2022-06-29

## 2022-06-29 RX ORDER — ONDANSETRON 2 MG/ML
4 INJECTION INTRAMUSCULAR; INTRAVENOUS ONCE
Status: COMPLETED | OUTPATIENT
Start: 2022-06-29 | End: 2022-06-29

## 2022-06-29 RX ORDER — PENICILLIN V POTASSIUM 500 MG/1
500 TABLET ORAL EVERY 8 HOURS SCHEDULED
Qty: 21 TABLET | Refills: 0 | Status: SHIPPED | OUTPATIENT
Start: 2022-06-29 | End: 2022-06-29 | Stop reason: SDUPTHER

## 2022-06-29 RX ORDER — PENICILLIN V POTASSIUM 250 MG/1
500 TABLET ORAL ONCE
Status: COMPLETED | OUTPATIENT
Start: 2022-06-29 | End: 2022-06-29

## 2022-06-29 RX ORDER — PENICILLIN V POTASSIUM 500 MG/1
500 TABLET ORAL EVERY 8 HOURS SCHEDULED
Qty: 21 TABLET | Refills: 0 | Status: SHIPPED | OUTPATIENT
Start: 2022-06-29 | End: 2022-07-06

## 2022-06-29 RX ADMIN — SODIUM CHLORIDE 1000 ML: 0.9 INJECTION, SOLUTION INTRAVENOUS at 14:38

## 2022-06-29 RX ADMIN — HALOPERIDOL LACTATE 2.5 MG: 5 INJECTION, SOLUTION INTRAMUSCULAR at 16:09

## 2022-06-29 RX ADMIN — ONDANSETRON 4 MG: 2 INJECTION INTRAMUSCULAR; INTRAVENOUS at 14:40

## 2022-06-29 RX ADMIN — PENICILLIN V POTASSIUM 500 MG: 250 TABLET, FILM COATED ORAL at 15:29

## 2022-06-29 NOTE — ED PROVIDER NOTES
History  Chief Complaint   Patient presents with    Sore Throat     59-year-old female presents emergency room sore throat since yesterday  No fevers chills cough congestion rhinorrhea pain right ankle vision changes  Also notes abdominal pain nausea vomiting intermittently for the past few months  Smokes marijuana every day  No diarrhea  Nonbloody nonbilious emesis  History provided by:  Patient  Sore Throat  Location:  Generalized  Quality:  Aching  Severity:  Moderate  Onset quality:  Gradual  Duration:  2 days  Timing:  Constant  Progression:  Unchanged  Chronicity:  New  Relieved by:  Nothing  Associated symptoms: abdominal pain    Associated symptoms: no chest pain, no chills, no cough, no ear pain, no fever, no rash and no shortness of breath        None       Past Medical History:   Diagnosis Date    Anxiety     Asthma     [facility])     Depression     Schizo affective schizophrenia ([facility] [address] )        Past Surgical History:   Procedure Laterality Date    DENTAL SURGERY      ELBOW FRACTURE SURGERY Left        History reviewed  No pertinent family history  I have reviewed and agree with the history as documented  E-Cigarette/Vaping     E-Cigarette/Vaping Substances     Social History     Tobacco Use    Smoking status: Current Every Day Smoker    Smokeless tobacco: Never Used   Substance Use Topics    Alcohol use: Yes    Drug use: Yes     Types: Marijuana       Review of Systems   Constitutional: Negative for chills and fever  HENT: Positive for sore throat  Negative for ear pain  Eyes: Negative for pain and visual disturbance  Respiratory: Negative for cough and shortness of breath  Cardiovascular: Negative for chest pain and palpitations  Gastrointestinal: Positive for abdominal pain, nausea and vomiting  Genitourinary: Negative for dysuria and hematuria  Musculoskeletal: Negative for arthralgias and back pain  Skin: Negative for color change and rash  Neurological: Negative for seizures and syncope  All other systems reviewed and are negative  Physical Exam  Physical Exam  Vitals and nursing note reviewed  Constitutional:       General: She is not in acute distress  Appearance: She is well-developed  HENT:      Head: Normocephalic and atraumatic  Nose: No congestion or rhinorrhea  Mouth/Throat:      Mouth: Mucous membranes are moist       Pharynx: Uvula midline  Oropharyngeal exudate and posterior oropharyngeal erythema present  Comments: Bilateral tonsillar enlargement with exudate  Eyes:      Conjunctiva/sclera: Conjunctivae normal    Cardiovascular:      Rate and Rhythm: Normal rate and regular rhythm  Heart sounds: No murmur heard  Pulmonary:      Effort: Pulmonary effort is normal  No respiratory distress  Breath sounds: Normal breath sounds  Abdominal:      General: Abdomen is flat  Bowel sounds are normal  There is no distension  Palpations: Abdomen is soft  Tenderness: There is no abdominal tenderness  There is no right CVA tenderness or left CVA tenderness  Musculoskeletal:      Cervical back: Neck supple  Skin:     General: Skin is warm and dry  Capillary Refill: Capillary refill takes less than 2 seconds  Neurological:      Mental Status: She is alert and oriented to person, place, and time           Vital Signs  ED Triage Vitals [06/29/22 1339]   Temperature Pulse Respirations Blood Pressure SpO2   98 1 °F (36 7 °C) 95 20 125/71 97 %      Temp Source Heart Rate Source Patient Position - Orthostatic VS BP Location FiO2 (%)   Oral Monitor Sitting Left arm --      Pain Score       8           Vitals:    06/29/22 1339 06/29/22 1616   BP: 125/71 127/81   Pulse: 95 61   Patient Position - Orthostatic VS: Sitting Lying         Visual Acuity      ED Medications  Medications   sodium chloride 0 9 % bolus 1,000 mL (0 mL Intravenous Stopped 6/29/22 1616)   ondansetron (ZOFRAN) injection 4 mg (4 mg Intravenous Given 6/29/22 1440)   penicillin V potassium (VEETID) tablet 500 mg (500 mg Oral Given 6/29/22 1529)   haloperidol lactate (HALDOL) injection 2 5 mg (2 5 mg Intramuscular Given 6/29/22 1609)       Diagnostic Studies  Results Reviewed     Procedure Component Value Units Date/Time    Strep A PCR [[id number]]  (Abnormal) Collected: 06/29/22 1436    Lab Status: Final result Specimen: Throat Updated: 06/29/22 1511     STREP A PCR Detected    Lipase [[id number]]  (Normal) Collected: 06/29/22 1436    Lab Status: Final result Specimen: Blood from Arm, Right Updated: 06/29/22 1503     Lipase 130 u/L     Magnesium [[id number]]  (Normal) Collected: 06/29/22 1436    Lab Status: Final result Specimen: Blood from Arm, Right Updated: 06/29/22 1503     Magnesium 2 0 mg/dL     CMP [[id number]] Collected: 06/29/22 1436    Lab Status: Final result Specimen: Blood from Arm, Right Updated: 06/29/22 1503     Sodium 140 mmol/L      Potassium 3 8 mmol/L      Chloride 106 mmol/L      CO2 27 mmol/L      ANION GAP 7 mmol/L      BUN 11 mg/dL      Creatinine 0 91 mg/dL      Glucose 97 mg/dL      Calcium 9 3 mg/dL      AST 20 U/L      ALT 24 U/L      Alkaline Phosphatase 83 U/L      Total Protein 7 9 g/dL      Albumin 4 7 g/dL      Total Bilirubin 1 18 mg/dL      eGFR 89 ml/min/1 73sq m     Narrative:      [place] guidelines for Chronic Kidney Disease (CKD):     Stage 1 with normal or high GFR (GFR > 90 mL/min/1 73 square meters)    Stage 2 Mild CKD (GFR = 60-89 mL/min/1 73 square meters)    Stage 3A Moderate CKD (GFR = 45-59 mL/min/1 73 square meters)    Stage 3B Moderate CKD (GFR = 30-44 mL/min/1 73 square meters)    Stage 4 Severe CKD (GFR = 15-29 mL/min/1 73 square meters)    Stage 5 End Stage CKD (GFR <15 mL/min/1 73 square meters)  Note: GFR calculation is accurate only with a steady state creatinine    Urine Microscopic [[id number]]  (Normal) Collected: 06/29/22 1404    Lab Status: Final result Specimen: Urine, Clean Catch Updated: 06/29/22 1451     RBC, UA 0-1 /hpf      WBC, UA 0-1 /hpf      Epithelial Cells Occasional /hpf      Bacteria, UA Occasional /hpf     CBC and differential [[id number]]  (Abnormal) Collected: 06/29/22 1436    Lab Status: Final result Specimen: Blood from Arm, Right Updated: 06/29/22 1448     WBC 16 07 Thousand/uL      RBC 5 30 Million/uL      Hemoglobin 15 9 g/dL      Hematocrit 48 2 %      MCV 91 fL      MCH 30 0 pg      MCHC 33 0 g/dL      RDW 11 8 %      MPV 10 9 fL      Platelets 623 Thousands/uL      nRBC 0 /100 WBCs      Neutrophils Relative 81 %      Immat GRANS % 0 %      Lymphocytes Relative 13 %      Monocytes Relative 5 %      Eosinophils Relative 1 %      Basophils Relative 0 %      Neutrophils Absolute 12 84 Thousands/µL      Immature Grans Absolute 0 06 Thousand/uL      Lymphocytes Absolute 2 16 Thousands/µL      Monocytes Absolute 0 84 Thousand/µL      Eosinophils Absolute 0 14 Thousand/µL      Basophils Absolute 0 03 Thousands/µL     UA w Reflex to Microscopic w Reflex to Culture [[id number]]  (Abnormal) Collected: 06/29/22 1404    Lab Status: Final result Specimen: Urine, Clean Catch Updated: 06/29/22 1422     Color, UA Straw     Clarity, UA Clear     Specific Gravity, UA 1 010     pH, UA 6 0     Leukocytes, UA Negative     Nitrite, UA Negative     Protein, UA Negative mg/dl      Glucose, UA Negative mg/dl      Ketones, UA Negative mg/dl      Bilirubin, UA Negative     Occult Blood, UA 10 0     UROBILINOGEN UA Negative mg/dL     POCT pregnancy, urine [[id number]]  (Normal) Resulted: 06/29/22 1407    Lab Status: Final result Specimen: Urine Updated: 06/29/22 1407     EXT PREG TEST UR (Ref: Negative) negative     Control Valid                 CT abdomen pelvis wo contrast   Final Result by Karon Orozco MD (06/29 1606)      No acute abnormality identified        Minimal degree of nonspecific wall thickening involving the pelvic small bowel loops located within the proximal/mid ileum possibly related to normal variant or degree of nonspecific enteritis  Clinical and labs correlation is recommended  Workstation performed: [id number]                    Procedures  Procedures         ED Course                               SBIRT 20yo+    Flowsheet Row Most Recent Value   SBIRT (25 yo +)    In order to provide better care to our patients, we are screening all of our patients for alcohol and drug use  Would it be okay to ask you these screening questions? No Filed at: 06/29/2022 1440                    [facility]  Number of Diagnoses or Management Options  Strep pharyngitis  Diagnosis management comments: Rapid strep positive, strep pharyngitis, will treat with penicillin  Abdominal CT unremarkable  Possibly cannabis hyperemesis  Disposition  Final diagnoses:   Strep pharyngitis     Time reflects when diagnosis was documented in both MDM as applicable and the Disposition within this note     Time User Action Codes Description Comment    6/29/2022  4:01 PM Federico Green Add [J02 0] Strep pharyngitis       ED Disposition     ED Disposition   Discharge    Condition   Stable    Date/Time   Wed Jun 29, 2022  4:29 PM    Comment   Marcy Boxer discharge to home/self care  Follow-up Information     Follow up With Specialties Details Why Contact Info    Norberto Santana, [address] [facility] Nurse Practitioner   [address] [facility] [address]  [address], [address] [facility] Nurse Practitioner   [address] [facility] [address] [phone]            Current Discharge Medication List      START taking these medications    Details   penicillin V potassium (VEETID) 500 mg tablet Take 1 tablet (500 mg total) by mouth every 8 (eight) hours for 7 days  Qty: 21 tablet, Refills: 0    Associated Diagnoses: Strep pharyngitis             No discharge procedures on file      PDMP Review     None          ED Provider  Electronically Signed by Herman Barrientos MD  06/29/22 2665

## 2024-01-13 ENCOUNTER — HOSPITAL ENCOUNTER (EMERGENCY)
Facility: HOSPITAL | Age: 24
Discharge: HOME | End: 2024-01-14
Attending: EMERGENCY MEDICINE
Payer: MEDICARE

## 2024-01-13 DIAGNOSIS — Z78.9 ALCOHOL USE: ICD-10-CM

## 2024-01-13 DIAGNOSIS — R11.0 NAUSEA: Primary | ICD-10-CM

## 2024-01-13 PROCEDURE — 99283 EMERGENCY DEPT VISIT LOW MDM: CPT

## 2024-01-13 RX ORDER — ONDANSETRON 4 MG/1
4 TABLET, ORALLY DISINTEGRATING ORAL ONCE
Status: COMPLETED | OUTPATIENT
Start: 2024-01-13 | End: 2024-01-13

## 2024-01-13 RX ADMIN — ONDANSETRON 4 MG: 4 TABLET, ORALLY DISINTEGRATING ORAL at 23:54

## 2024-01-14 VITALS
DIASTOLIC BLOOD PRESSURE: 89 MMHG | TEMPERATURE: 98.4 F | HEART RATE: 89 BPM | RESPIRATION RATE: 18 BRPM | SYSTOLIC BLOOD PRESSURE: 124 MMHG | OXYGEN SATURATION: 100 %

## 2024-01-14 PROCEDURE — 99284 EMERGENCY DEPT VISIT MOD MDM: CPT | Performed by: EMERGENCY MEDICINE

## 2024-01-14 RX ORDER — ONDANSETRON 4 MG/1
4 TABLET, ORALLY DISINTEGRATING ORAL EVERY 6 HOURS PRN
Qty: 10 TABLET | Refills: 0 | Status: SHIPPED | OUTPATIENT
Start: 2024-01-14

## 2024-01-14 NOTE — ED PROVIDER NOTES
History  No chief complaint on file.    Pt stated that she drank 2 bottles of wine and is here for IV fluids. She has some nausea but She denies any pain or any other symptoms at this time. Alert and oriented      HPI    24 yo F hx of bipolar depression anxiety presents to ed for eval after drinking alcohol. Oriented to person place time, steady gait, clincially sober. Patient states she felt nauseous so came to ER after drinking. Denies hx of abuse. Declines rehab. Just wants meds and IV fluids to make her feel better. No other complaints on ros.    None       Past Medical History:   Diagnosis Date    Anxiety     Asthma     Bipolar 1 disorder (HCC)     Depression     Schizo affective schizophrenia (HCC)        Past Surgical History:   Procedure Laterality Date    DENTAL SURGERY      ELBOW FRACTURE SURGERY Left        No family history on file.  I have reviewed and agree with the history as documented.    E-Cigarette/Vaping     E-Cigarette/Vaping Substances     Social History     Tobacco Use    Smoking status: Every Day    Smokeless tobacco: Never   Substance Use Topics    Alcohol use: Yes    Drug use: Yes     Types: Marijuana       Review of Systems   Constitutional:  Negative for chills, fatigue and fever.   HENT:  Negative for sore throat.    Eyes:  Negative for redness and visual disturbance.   Respiratory:  Negative for cough and shortness of breath.    Cardiovascular:  Negative for chest pain.   Gastrointestinal:  Positive for nausea. Negative for abdominal pain and diarrhea.   Genitourinary:  Negative for difficulty urinating, dysuria and pelvic pain.   Musculoskeletal:  Negative for back pain.   Skin:  Negative for rash.   Neurological:  Negative for syncope, weakness and headaches.   All other systems reviewed and are negative.      Physical Exam  Physical Exam  Vitals and nursing note reviewed.   Constitutional:       General: She is not in acute distress.  HENT:      Head: Normocephalic and atraumatic.       Right Ear: External ear normal.      Left Ear: External ear normal.   Eyes:      Extraocular Movements: Extraocular movements intact.      Conjunctiva/sclera: Conjunctivae normal.   Cardiovascular:      Rate and Rhythm: Normal rate and regular rhythm.      Heart sounds: Normal heart sounds.   Pulmonary:      Effort: Pulmonary effort is normal. No respiratory distress.      Breath sounds: Normal breath sounds.   Abdominal:      General: Abdomen is flat.      Tenderness: There is no abdominal tenderness.   Musculoskeletal:         General: Normal range of motion.      Cervical back: Normal range of motion.   Skin:     General: Skin is warm and dry.   Neurological:      Mental Status: She is alert and oriented to person, place, and time.      Cranial Nerves: No cranial nerve deficit.      Motor: No abnormal muscle tone.      Coordination: Coordination normal.         Vital Signs  ED Triage Vitals [01/14/24 0031]   Temperature Pulse Respirations Blood Pressure SpO2   98.4 °F (36.9 °C) 89 18 124/89 100 %      Temp Source Heart Rate Source Patient Position - Orthostatic VS BP Location FiO2 (%)   Oral -- Lying Right arm --      Pain Score       --           Vitals:    01/14/24 0031   BP: 124/89   Pulse: 89   Patient Position - Orthostatic VS: Lying         Visual Acuity      ED Medications  Medications   ondansetron (ZOFRAN-ODT) dispersible tablet 4 mg (4 mg Oral Given 1/13/24 1346)       Diagnostic Studies  Results Reviewed       None                   No orders to display              Procedures  Procedures         ED Course                                             Medical Decision Making  Risk  Prescription drug management.    History Provided by patient     Differential considered: alcohol intoxication    Consideration of tests: patient is protecting airway, is already clinically sober. Patient given zofran and tolerated po challenge.    Patient ambulating without difficulty, no slurred speech. Discharged with pcp  follow up.    The patient was instructed to follow up as documented. Strict return precautions were discussed with the patient and the patient was instructed to return to the emergency department immediately if symptoms worsen. The patient/patient family member acknowledged and were in agreement with plan.            Disposition  Final diagnoses:   Nausea   Alcohol use     Time reflects when diagnosis was documented in both MDM as applicable and the Disposition within this note       Time User Action Codes Description Comment    1/14/2024 12:28 AM Garrett Valdez [R11.0] Nausea     1/14/2024 12:28 AM Garrett Valdez [Z78.9] Alcohol use           ED Disposition       ED Disposition   Discharge    Condition   Stable    Date/Time   Sun Jan 14, 2024 12:28 AM    Comment   Abilioолег Da discharge to home/self care.                   Follow-up Information       Follow up With Specialties Details Why Contact Info    IVORY Rao Nurse Practitioner Schedule an appointment as soon as possible for a visit in 3 days For follow up regarding your symptoms and recheck [address] [place] [id number] [phone]              Discharge Medication List as of 1/14/2024 12:34 AM        START taking these medications    Details   ondansetron (ZOFRAN-ODT) 4 mg disintegrating tablet Take 1 tablet (4 mg total) by mouth every 6 (six) hours as needed for nausea or vomiting, Starting Sun 1/14/2024, Normal             No discharge procedures on file.    PDMP Review       None            ED Provider  Electronically Signed by             aGrrett Valdez MD  01/14/24 0616

## 2024-01-14 NOTE — ED TRIAGE NOTES
Pt stated that she drank 2 bottles of wine and is here for IV fluids. She has some nausea but She denies any pain or any other symptoms at this time. Alert and oriented